# Patient Record
Sex: FEMALE | Race: WHITE
[De-identification: names, ages, dates, MRNs, and addresses within clinical notes are randomized per-mention and may not be internally consistent; named-entity substitution may affect disease eponyms.]

---

## 2017-09-10 ENCOUNTER — HEALTH MAINTENANCE LETTER (OUTPATIENT)
Age: 22
End: 2017-09-10

## 2019-01-17 ENCOUNTER — OFFICE VISIT (OUTPATIENT)
Dept: FAMILY MEDICINE | Facility: CLINIC | Age: 24
End: 2019-01-17
Payer: COMMERCIAL

## 2019-01-17 VITALS
SYSTOLIC BLOOD PRESSURE: 119 MMHG | HEIGHT: 65 IN | DIASTOLIC BLOOD PRESSURE: 77 MMHG | TEMPERATURE: 98.7 F | RESPIRATION RATE: 14 BRPM | WEIGHT: 127.25 LBS | HEART RATE: 70 BPM | BODY MASS INDEX: 21.2 KG/M2 | OXYGEN SATURATION: 99 %

## 2019-01-17 DIAGNOSIS — Q85.89 CONGENITAL HYPOTHALAMIC HAMARTOMA SYNDROME (H): Primary | ICD-10-CM

## 2019-01-17 DIAGNOSIS — Q87.89 CONGENITAL HYPOTHALAMIC HAMARTOMA SYNDROME (H): Primary | ICD-10-CM

## 2019-01-17 DIAGNOSIS — Z23 NEED FOR PROPHYLACTIC VACCINATION AND INOCULATION AGAINST INFLUENZA: ICD-10-CM

## 2019-01-17 PROCEDURE — 90471 IMMUNIZATION ADMIN: CPT | Performed by: FAMILY MEDICINE

## 2019-01-17 PROCEDURE — 99203 OFFICE O/P NEW LOW 30 MIN: CPT | Mod: 25 | Performed by: FAMILY MEDICINE

## 2019-01-17 PROCEDURE — 90686 IIV4 VACC NO PRSV 0.5 ML IM: CPT | Performed by: FAMILY MEDICINE

## 2019-01-17 ASSESSMENT — MIFFLIN-ST. JEOR: SCORE: 1325.14

## 2019-01-17 NOTE — PROGRESS NOTES
SUBJECTIVE:   Carlos Bran is a 23 year old female who presents to clinic today for the following health issues:      Pt is here to establish care and follow-up chronic medical condition.  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  She was followed by pediatric endocrinology and pediatric neurology at [place].  Her puberty was delayed with hormonal therapy and she had her first menses at age 13.  We do have a [place] neurology note from 2014.  This indicated that her brain MRIs showed stable size of the hamartoma and no further neurologic follow-up was needed.  She believes she was getting labs monitored through endocrinology every 2 years.  However she is not sure if she returned after 2014.  She never developed any associated seizures and it was felt that she would unlikely develop them at this point.  However she was counseled by Neurology to avoid sleep deprivation due to the remote chance of associated seizure.  She has had associated chronic headaches which persist.  They are unchanged and generally require no analgesics on her part.    Headache  Onset:  Since childhood    Description:   Location: bilateral in the temporal area, bilateral in the occipital area   Character: throbbing pain, squeezing pain, and pressure  Frequency:  1-4 times a months   Duration:  2-4 days     Intensity: moderate    Progression of Symptoms:  same    Accompanying Signs & Symptoms:  Stiff neck: YES  Neck or upper back pain: no   Fever: no   Sinus pressure: no   Nausea or vomiting: no  Dizziness: no  Numbness: no   Weakness: no  Visual changes: no    History:   Head trauma: no  Family history of migraines: no   Previous tests for headaches: YES  Neurologist evaluations: YES  Able to do daily activities: YES  Wake with a headaches: YES- sometimes  Do headaches wake you up: no   Daily pain medication use: no   Work/school stressors/changes: YES    Precipitating factors:   Does light  "make it worse: YES  Does sound make it worse: YES    Alleviating factors:  Does sleep help: YES    Therapies Tried and outcome: Ibuprofen (Advil, Motrin) , Midol and Tylenol    SHX: Since graduating from the [facility] [facility] she has been living in [place].  She will be staying in the [place] for the next 6 months but then plans to travel with her sister throughout the [place].    Problem list and histories reviewed & adjusted, as indicated.  Additional history: as documented    BP Readings from Last 3 Encounters:   01/17/19 119/77   11/23/15 110/80   04/28/14 105/74    Wt Readings from Last 3 Encounters:   01/17/19 57.7 kg (127 lb 4 oz)   11/23/15 55.7 kg (122 lb 12.8 oz)   04/25/13 61.7 kg (136 lb) (72 %)*     * Growth percentiles are based on [facility] (Girls, 2-20 Years) data.             Reviewed and updated as needed this visit by clinical staff  Tobacco  Allergies  Meds  Problems  Med Hx  Surg Hx  Fam Hx  Soc Hx        Reviewed and updated as needed this visit by Provider  Tobacco  Allergies  Meds  Problems  Med Hx  Surg Hx  Soc Hx        ROS:  Constitutional, HEENT, cardiovascular, pulmonary, GI, , musculoskeletal, neuro, skin, endocrine and psych systems are negative, except as otherwise noted.    OBJECTIVE:     /77 (BP Location: Right arm, Patient Position: Chair, Cuff Size: Adult Regular)   Pulse 70   Temp 98.7  F (37.1  C) (Oral)   Resp 14   Ht 1.638 m (5' 4.5\")   Wt 57.7 kg (127 lb 4 oz)   LMP 12/24/2018 (Approximate)   SpO2 99%   BMI 21.51 kg/m    Body mass index is 21.51 kg/m .  GENERAL: healthy, alert and no distress  EYES: Eyes grossly normal to inspection, PERRL, EOMI, and conjunctivae and sclerae normal  HENT: ear canals and TM's normal, nose and mouth without ulcers or lesions  NECK: no adenopathy, no asymmetry, masses, or scars and thyroid normal to palpation  RESP: lungs clear to auscultation - no rales, rhonchi or wheezes  BREAST: normal without masses, " tenderness or nipple discharge and no palpable axillary masses or adenopathy  CV: regular rate and rhythm, normal S1 S2, no S3 or S4, no murmur, click or rub, no peripheral edema and peripheral pulses strong  MS: no gross musculoskeletal defects noted, no edema  SKIN: no suspicious lesions or rashes  NEURO:  No focal deficits noted, No facial asymmetry, Equal movement of all 4 extremities, Strength grossly intact, Finger-nose-finger intact, Normal gait including tandem gait   PSYCH: mentation appears normal, affect normal/bright      ASSESSMENT/PLAN:       1. Congenital hypothalamic hamartoma syndrome (H)  She signed a release of information to get further records from [facility].  Once I have a chance to review that I will determine if she needs repeat labs versus endocrinology referral.  She does not believe her hematoma has been imaged for 5 years so I did go ahead and order the MR brain for that.  - MR Brain w/o & w Contrast; Future    2. Need for prophylactic vaccination and inoculation against influenza  - FLU VACCINE, SPLIT VIRUS, IM (QUADRIVALENT) [71928]- >3 YRS  - Vaccine Administration, Initial [52117]       Patient Instructions   Call [facility] Scheduling [phone] to schedule MRI.    Schedule a physical with pap.        Julianne Durant MD  [facility]

## 2019-01-17 NOTE — PROGRESS NOTES

## 2019-01-24 ENCOUNTER — ANCILLARY PROCEDURE (OUTPATIENT)
Dept: MRI IMAGING | Facility: CLINIC | Age: 24
End: 2019-01-24
Payer: COMMERCIAL

## 2019-01-24 DIAGNOSIS — Q85.89 CONGENITAL HYPOTHALAMIC HAMARTOMA SYNDROME (H): ICD-10-CM

## 2019-01-24 DIAGNOSIS — Q87.89 CONGENITAL HYPOTHALAMIC HAMARTOMA SYNDROME (H): ICD-10-CM

## 2019-01-24 RX ORDER — GADOBUTROL 604.72 MG/ML
7.5 INJECTION INTRAVENOUS ONCE
Status: COMPLETED | OUTPATIENT
Start: 2019-01-24 | End: 2019-01-24

## 2019-01-24 RX ADMIN — GADOBUTROL 6 ML: 604.72 INJECTION INTRAVENOUS at 18:22

## 2019-01-25 NOTE — DISCHARGE INSTRUCTIONS
MRI Contrast Discharge Instructions    The IV contrast you received today will pass out of your body in your  urine. This will happen in the next 24 hours. You will not feel this process.  Your urine will not change color.    Drink at least 4 extra glasses of water or juice today (unless your doctor  has restricted your fluids). This reduces the stress on your kidneys.  You may take your regular medicines.    If you are on dialysis: It is best to have dialysis today.    If you have a reaction: Most reactions happen right away. If you have  any new symptoms after leaving the hospital (such as hives or swelling),  call your hospital at the correct number below. Or call your family doctor.  If you have breathing distress or wheezing, call 911.    Special instructions: ***    I have read and understand the above information.    Signature:______________________________________ Date:___________    Staff:__________________________________________ Date:___________     Time:__________    [facility] Departments:    ___[place]: [phone]  ___[facility]: [phone]  ___[place]: [phone] ___[facility]: [phone]  ___[facility]: [phone]  ___[facility]: [phone]  ___[phone]  ___[facility]: [phone]  ___[phone]

## 2019-01-28 ENCOUNTER — TELEPHONE (OUTPATIENT)
Dept: FAMILY MEDICINE | Facility: CLINIC | Age: 24
End: 2019-01-28

## 2019-01-28 DIAGNOSIS — Q85.89 CONGENITAL HYPOTHALAMIC HAMARTOMA SYNDROME (H): Primary | ICD-10-CM

## 2019-01-28 DIAGNOSIS — Q87.89 CONGENITAL HYPOTHALAMIC HAMARTOMA SYNDROME (H): Primary | ICD-10-CM

## 2019-01-28 NOTE — TELEPHONE ENCOUNTER
Called  [facility].  I was transferred to the film room. ([phone])    I talked to Cal[place].  He will try to get MRIs from [facility] to compare.  This may not happen today.    If he is not able to get the MRI's from [facility], he will call clinic back or message Dr. Durant.  I did tell pt that this is typically not something that [facility] is involved in getting.    COOPER on file for [facility]. This should cover any COOPER concerns.  I discussed this with Dr. Durant.  KAEL Garcia

## 2019-01-28 NOTE — RESULT ENCOUNTER NOTE
Solo Gonzalez,  This looks good.  Unfortunately they did not obtain the [facility] images for direct comparison and I've asked them to do so.      On a separate note, I did receive your [place] records including the labs Dr. Waters recommends to be done every 2 years.  You did see her in June 2016 so you are do for those now.  I will go ahead and order them.  You can make a morning lab-only appointment to have them done.  (This will include the AM cortisol level.)      Julianne Durant MD

## 2019-01-28 NOTE — TELEPHONE ENCOUNTER
Please call Radiology and have them obtain prior MRI's for comparison and have this re-read by the radiologist.  The purpose of the MRI was surveillance - to look for any change in her tumor so they do need to obtain the prior imaging from [facility] to do a direct comparison.    Julianne Durant MD       Results for orders placed or performed in visit on 01/24/19   MR Brain w/o & w Contrast    Narrative    Brain/Pituitary MRI without and with contrast    History: Headache, chronic, normal neuro exam; history of hypothalamic  hamartoma previously followed at [facility]; Congenital hypothalamic  hamartoma syndrome (H).   ICD-10: Congenital hypothalamic hamartoma syndrome (H)  Comparison:  none     Technique: Axial diffusion and FLAIR images of the whole brain  obtained without intravenous contrast. Sagittal T1-weighted, coronal  T2-weighted, coronal T1-weighted images with focus on the sella were  obtained without intravenous contrast. Post intravenous contrast using  gadolinium coronal and sagittal T1-weighted images were obtained  focused on the sella.  Dynamic postcontrast coronal T1-weighted images  were also obtained.    Contrast: 6 cc Gadavist intravenously    Findings:    There is a suprasellar mass measuring 12 x 9 x 15 mm, dorsal to the  pituitary infundibulum, and nonenhancing, and dorsal to the enhancing  portion of the hypothalamus-tuber cinereum. This appears to displace  the mamillary bodies posteriorly, based on axial flair images, so  presumably is from the hypothalamus, arising from the portion  hypothalamus anterior to the mamillary bodies and posterior to the  tuber cinereum. The pituitary stalk is slightly displaced to the right  of midline. Basilar artery is along the posterior edge of the mass.  There is slight tenting of the pituitary infundibulum, which can be  seen in a female of childbearing age, and likely is a variation within  normal limits for a female of this age. The major cavernous  carotid  vascular flow-voids appear patent. No definite mass noted within the  sella itself.  Basal cisterns are patent.  FLAIR images through the whole brain are unremarkable, and demonstrate  no mass effect, midline shift, or significant enlargement of the  ventricles.       Impression    Impression:   1. 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   2. Structurally, the remainder of the brain appears normal.    JARRET HART MD

## 2019-01-28 NOTE — TELEPHONE ENCOUNTER
Yes, if an imaging study is done for surveillance of a known tumor they should obtain the outside films for comparison.      On a separate note I did receive her records from [facility] and have contacted her to schedule a lab-only appointment for her Endo monitoring labs.      Julianne Durant MD

## 2019-02-02 ENCOUNTER — MYC MEDICAL ADVICE (OUTPATIENT)
Dept: FAMILY MEDICINE | Facility: CLINIC | Age: 24
End: 2019-02-02

## 2019-02-02 DIAGNOSIS — Z82.49 FAMILY HISTORY OF AORTIC DISSECTION: Primary | ICD-10-CM

## 2019-02-02 DIAGNOSIS — R93.1 ABNORMAL FINDING ON ECHOCARDIOGRAM: ICD-10-CM

## 2019-02-04 DIAGNOSIS — Q87.89 CONGENITAL HYPOTHALAMIC HAMARTOMA SYNDROME (H): ICD-10-CM

## 2019-02-04 DIAGNOSIS — Q85.89 CONGENITAL HYPOTHALAMIC HAMARTOMA SYNDROME (H): ICD-10-CM

## 2019-02-04 LAB
CORTIS SERPL-MCNC: 12.7 UG/DL (ref 4–22)
IGF BINDING PROTEIN 3 SD SCORE: 0.1
IGF BP3 SERPL-MCNC: 5.7 UG/ML (ref 3.4–7.8)
T4 FREE SERPL-MCNC: 0.9 NG/DL (ref 0.76–1.46)
TSH SERPL DL<=0.005 MIU/L-ACNC: 1.32 MU/L (ref 0.4–4)

## 2019-02-04 PROCEDURE — 84443 ASSAY THYROID STIM HORMONE: CPT | Performed by: FAMILY MEDICINE

## 2019-02-04 PROCEDURE — 84305 ASSAY OF SOMATOMEDIN: CPT | Performed by: FAMILY MEDICINE

## 2019-02-04 PROCEDURE — 82533 TOTAL CORTISOL: CPT | Performed by: FAMILY MEDICINE

## 2019-02-04 PROCEDURE — 82397 CHEMILUMINESCENT ASSAY: CPT | Performed by: FAMILY MEDICINE

## 2019-02-04 PROCEDURE — 84439 ASSAY OF FREE THYROXINE: CPT | Performed by: FAMILY MEDICINE

## 2019-02-04 PROCEDURE — 36415 COLL VENOUS BLD VENIPUNCTURE: CPT | Performed by: FAMILY MEDICINE

## 2019-02-04 NOTE — TELEPHONE ENCOUNTER
Yes, I signed order.  Thanks!    Echo further notes:  Aortic arch appears to be slightly   generous in size qualitatively. The aortic annulus is 20 mm, z-score   is +1.5. Sinotubular ridge is 25 mm, z-score of +1.8. Sinus of   Valsalva is 30, z-score is +1.6. Ascending aorta is 25 mm, z-score is   +1.4 mm.    Julianne Durant MD

## 2019-02-04 NOTE — TELEPHONE ENCOUNTER
"Dr. Durant-Please review and advise/sign if agree.    Echocardiogram pended.      Per 12/9/08 echo report:  \"Color Flow CONCLUSION:   Normal intracardiac anatomy. Good function.   No shunts seen. The aortic root measurements of sinuses of Valsalva   z-score value of +1.6, sinotubular ridge z-score of +1.8. The results   were communicated to Dr. Spatz. Would recommend a follow up of the   aortic arch yearly. \"      Thank you!  LIUDMILA Sierra, CHANAN, RN    "

## 2019-02-05 LAB — IGF-I BLD-MCNC: 274 NG/ML (ref 103–326)

## 2019-02-18 ENCOUNTER — MYC MEDICAL ADVICE (OUTPATIENT)
Dept: FAMILY MEDICINE | Facility: CLINIC | Age: 24
End: 2019-02-18

## 2019-02-18 NOTE — TELEPHONE ENCOUNTER
Writer called [phone] but this went to [facility], an inpatient unit.    Writer next called [phone] and was transferred to radiology/MRI Mariella morgan.    She stated she will ask a radiologist to follow up on comparison between previous MRI and the MRI from 1/24/19.  They have an MRI from [facility] dated 2/3/14.    CHANA AvilezN, RN

## 2019-02-20 ENCOUNTER — HOSPITAL ENCOUNTER (OUTPATIENT)
Dept: CARDIOLOGY | Facility: CLINIC | Age: 24
Discharge: HOME | End: 2019-02-20
Attending: FAMILY MEDICINE | Admitting: FAMILY MEDICINE
Payer: COMMERCIAL

## 2019-02-20 DIAGNOSIS — Z82.49 FAMILY HISTORY OF AORTIC DISSECTION: ICD-10-CM

## 2019-02-20 DIAGNOSIS — R93.1 ABNORMAL FINDING ON ECHOCARDIOGRAM: ICD-10-CM

## 2019-02-20 PROCEDURE — 93306 TTE W/DOPPLER COMPLETE: CPT | Mod: 26 | Performed by: INTERNAL MEDICINE

## 2019-02-20 PROCEDURE — 93306 TTE W/DOPPLER COMPLETE: CPT

## 2019-02-20 NOTE — TELEPHONE ENCOUNTER
"Dr. Durant-Please review.  Writer wanted to update you on status.    MIRIAN Morales, , contacted [facility], who stated images would be \"pushed\" to [facility] within a week.    CHANA AvilezN, RN    "

## 2019-02-25 NOTE — TELEPHONE ENCOUNTER
"The writer spoke to [facility] last week who [facility].  The writer spoke to Jose Martin today at [facility] [phone] who confirmed that both [facility] and [facility] images are available for comparison.  The writer advised Dr. Durant that a \"radiology outside read\" order will need to be placed in order to facilitate the comparison read.  "

## 2019-02-26 ENCOUNTER — HOSPITAL ENCOUNTER (INPATIENT)
Dept: GENERAL RADIOLOGY | Facility: CLINIC | Age: 24
End: 2019-02-26
Attending: FAMILY MEDICINE

## 2019-02-26 DIAGNOSIS — Q87.89 CONGENITAL HYPOTHALAMIC HAMARTOMA SYNDROME (H): ICD-10-CM

## 2019-02-26 DIAGNOSIS — Q85.89 CONGENITAL HYPOTHALAMIC HAMARTOMA SYNDROME (H): ICD-10-CM

## 2019-02-26 NOTE — TELEPHONE ENCOUNTER
I placed the order for outside MRI read yesterday. This was ordered on the telephone encounter 1/28/2019 for the same issue.

## 2019-03-01 NOTE — TELEPHONE ENCOUNTER
Please check: What is the status on this?  I still have not received a radiologist's report comparing the new MRI to the old one which was obtained from [facility].  I placed the order for the comparison read on Monday.  Please note we've been trying to get this for over a month.    Julianne Durant MD

## 2019-03-01 NOTE — TELEPHONE ENCOUNTER
Dr. Durant     Spoke to Tani at radiology     He will send a message to Dr. Frederick to review BOTH MRI's and do an addendum to his report from 1/24/19 comparing the MRI from 2/3/2014     Ella Chandler Registered Nurse   [facility]

## 2019-03-06 NOTE — TELEPHONE ENCOUNTER
I called [facility]. I talked to Reji- a neuro radiologist.  He felt I should be calling the film file room to get this comparison done.  I asked him how we are suppose to know the process to get this MRI comparison done.  Dr. Durant placed the order for outside MRI reading. Nothing says to then call the film file room to get this accomplished.  I did say that the pt should be the focus on getting this direct comparison done. This really has had a long delay in getting a response to the pt.      We have been working on this and waiting since the end of 1/2019.  Reji was able to confirm that they rec'd the MRI from [place] dated 2/2014.  He will have Dr. Bedoya do the direct comparison tomorrow. Since this was the radiologist that did the read from 1/24/19  KAEL Garcia

## 2019-03-11 NOTE — TELEPHONE ENCOUNTER
"I'm still not seeing an addendum or comparison as was promised \"tomorrow\" by radiology last week.  Not sure who to contact.  Mariella Leiva, Tani, Reji?  I will forward again to our dyad lead as clearly this needs to go up the chain.      Next time I have a [facility] patient who needs surveillance imaging to follow a tumor I think I'll try referring them back to [facility].    Julianne Durant MD   "

## 2019-03-12 NOTE — TELEPHONE ENCOUNTER
"OK, I called Radiology myself:  2:53 I called general radiology and spoke with Caryl at [phone] requesting to speak with neuro-radiology at [facility].  She gave me number for Neuro-Radiology: [phone] but then said the line was busy so she directed me to call [phone] instead.      3:00 I called [phone] and spoke to Selma who confirmed the Neuro-Radiology numbers were \"all busy.\"  She then paged radiology resident.    3:05 I spoke to radiology resident Dr. Solo Carranza who confirmed they have the order for the comparison and the outside films.  He said he would have Dr. Bedoya (the original neuro-radiologist) read this today.  I pointed out that this is the third time in the past 2 weeks that we've been told that Dr. Bedoya would read the comparison.  (see below - we were told this by another radiologist \"Reji\" on 3/6 and by Tani on 3/1).  He said he would call back if comparison read can't be done today.      Julianne Durant MD   "

## 2019-03-13 NOTE — TELEPHONE ENCOUNTER
Still not seeing an addendum done.  Don - Am I missing it?  Next steps?  Thoughts?  Julianne Durant MD

## 2019-03-18 NOTE — RESULT ENCOUNTER NOTE
Solo Gonzalez,  I apologize for the ridiculous delay in getting this comparison read.  It has finally been officially reported as showing no change from the previous MRI done at [facility].    Julianne Durant MD

## 2019-11-08 ENCOUNTER — HEALTH MAINTENANCE LETTER (OUTPATIENT)
Age: 24
End: 2019-11-08

## 2020-02-23 ENCOUNTER — HEALTH MAINTENANCE LETTER (OUTPATIENT)
Age: 25
End: 2020-02-23

## 2020-12-06 ENCOUNTER — HEALTH MAINTENANCE LETTER (OUTPATIENT)
Age: 25
End: 2020-12-06

## 2021-09-25 ENCOUNTER — HEALTH MAINTENANCE LETTER (OUTPATIENT)
Age: 26
End: 2021-09-25

## 2022-01-15 ENCOUNTER — HEALTH MAINTENANCE LETTER (OUTPATIENT)
Age: 27
End: 2022-01-15

## 2023-01-07 ENCOUNTER — HEALTH MAINTENANCE LETTER (OUTPATIENT)
Age: 28
End: 2023-01-07

## 2023-04-22 ENCOUNTER — HEALTH MAINTENANCE LETTER (OUTPATIENT)
Age: 28
End: 2023-04-22

## 2024-11-24 ENCOUNTER — OFFICE VISIT (OUTPATIENT)
Age: 29
End: 2024-11-24

## 2024-11-24 VITALS
TEMPERATURE: 98.9 F | BODY MASS INDEX: 35.24 KG/M2 | HEIGHT: 64 IN | DIASTOLIC BLOOD PRESSURE: 80 MMHG | OXYGEN SATURATION: 99 % | WEIGHT: 206.4 LBS | HEART RATE: 100 BPM | SYSTOLIC BLOOD PRESSURE: 128 MMHG | RESPIRATION RATE: 16 BRPM

## 2024-11-24 DIAGNOSIS — J02.9 VIRAL PHARYNGITIS: ICD-10-CM

## 2024-11-24 DIAGNOSIS — J22 LOWER RESPIRATORY TRACT INFECTION: Primary | ICD-10-CM

## 2024-11-24 RX ORDER — LISDEXAMFETAMINE DIMESYLATE 10 MG/1
CAPSULE ORAL
COMMUNITY
Start: 2024-08-30

## 2024-11-24 RX ORDER — AZITHROMYCIN 250 MG/1
TABLET, FILM COATED ORAL
Qty: 6 TABLET | Refills: 0 | Status: SHIPPED | OUTPATIENT
Start: 2024-11-24 | End: 2024-12-04

## 2024-11-24 RX ORDER — IBUPROFEN 800 MG/1
800 TABLET, FILM COATED ORAL EVERY 8 HOURS PRN
COMMUNITY
Start: 2024-08-30

## 2024-11-24 RX ORDER — DEXTROMETHORPHAN HYDROBROMIDE AND PROMETHAZINE HYDROCHLORIDE 15; 6.25 MG/5ML; MG/5ML
5 SYRUP ORAL 4 TIMES DAILY PRN
Qty: 118 ML | Refills: 0 | Status: SHIPPED | OUTPATIENT
Start: 2024-11-24 | End: 2024-12-01

## 2024-11-24 RX ORDER — LISDEXAMFETAMINE DIMESYLATE 20 MG/1
20 CAPSULE ORAL DAILY
COMMUNITY
Start: 2024-10-31 | End: 2024-12-06

## 2024-11-24 RX ORDER — IBUPROFEN 600 MG/1
600 TABLET, FILM COATED ORAL 3 TIMES DAILY PRN
Qty: 30 TABLET | Refills: 0 | Status: SHIPPED | OUTPATIENT
Start: 2024-11-24

## 2024-11-24 RX ORDER — ACETAMINOPHEN 500 MG
1000 TABLET ORAL EVERY 6 HOURS PRN
Qty: 120 TABLET | Refills: 0 | Status: SHIPPED | OUTPATIENT
Start: 2024-11-24